# Patient Record
Sex: FEMALE | URBAN - METROPOLITAN AREA
[De-identification: names, ages, dates, MRNs, and addresses within clinical notes are randomized per-mention and may not be internally consistent; named-entity substitution may affect disease eponyms.]

---

## 2019-04-18 ENCOUNTER — APPOINTMENT (OUTPATIENT)
Age: 54
Setting detail: DERMATOLOGY
End: 2019-04-18

## 2019-04-18 DIAGNOSIS — L98.8 OTHER SPECIFIED DISORDERS OF THE SKIN AND SUBCUTANEOUS TISSUE: ICD-10-CM

## 2019-04-18 PROCEDURE — OTHER BOTOX: OTHER

## 2019-10-07 ENCOUNTER — APPOINTMENT (OUTPATIENT)
Age: 54
Setting detail: DERMATOLOGY
End: 2019-10-07

## 2019-10-07 DIAGNOSIS — L98.8 OTHER SPECIFIED DISORDERS OF THE SKIN AND SUBCUTANEOUS TISSUE: ICD-10-CM

## 2019-10-07 PROCEDURE — OTHER BOTOX: OTHER

## 2020-08-03 ENCOUNTER — APPOINTMENT (OUTPATIENT)
Age: 55
Setting detail: DERMATOLOGY
End: 2020-08-16

## 2020-08-03 VITALS — TEMPERATURE: 98 F

## 2020-08-03 DIAGNOSIS — L98.8 OTHER SPECIFIED DISORDERS OF THE SKIN AND SUBCUTANEOUS TISSUE: ICD-10-CM

## 2020-08-03 PROCEDURE — OTHER BOTOX: OTHER

## 2021-07-23 ENCOUNTER — APPOINTMENT (OUTPATIENT)
Dept: URBAN - METROPOLITAN AREA CLINIC 265 | Age: 56
Setting detail: DERMATOLOGY
End: 2021-08-23

## 2021-07-23 DIAGNOSIS — L98.8 OTHER SPECIFIED DISORDERS OF THE SKIN AND SUBCUTANEOUS TISSUE: ICD-10-CM

## 2021-07-23 PROCEDURE — OTHER BOTOX: OTHER

## 2022-10-05 ENCOUNTER — APPOINTMENT (OUTPATIENT)
Dept: URBAN - METROPOLITAN AREA CLINIC 265 | Age: 57
Setting detail: DERMATOLOGY
End: 2022-10-17

## 2022-10-05 DIAGNOSIS — L98.8 OTHER SPECIFIED DISORDERS OF THE SKIN AND SUBCUTANEOUS TISSUE: ICD-10-CM

## 2022-10-05 PROCEDURE — OTHER BOTOX: OTHER

## 2023-11-08 ENCOUNTER — APPOINTMENT (OUTPATIENT)
Dept: URBAN - METROPOLITAN AREA CLINIC 298 | Age: 58
Setting detail: DERMATOLOGY
End: 2023-11-10

## 2023-11-08 DIAGNOSIS — L98.8 OTHER SPECIFIED DISORDERS OF THE SKIN AND SUBCUTANEOUS TISSUE: ICD-10-CM

## 2023-11-08 PROCEDURE — OTHER BOTOX: OTHER

## 2023-11-08 NOTE — PROCEDURE: BOTOX
Show Orbicularis Oculi Units: Yes
Periorbital Skin Units: 0
Show Right And Left Periorbital Units: No
Additional Area 2 Location: upper lip
Detail Level: Detailed
Incrementing Botox Units: By 0.5 Units
Inferior Lateral Orbicularis Oculi Units: 24
Consent: Written consent obtained. Risks include but not limited to lid/brow ptosis, bruising, swelling, diplopia, temporary effect, incomplete chemical denervation.
Post-Care Instructions: Patient instructed to not lie down for 4 hours and limit physical activity for 24 hours.
Show Inventory Tab: Show
Glabellar Complex Units: 20
Dilution (U/0.1 Cc): 4
